# Patient Record
Sex: FEMALE | Race: BLACK OR AFRICAN AMERICAN | Employment: FULL TIME | URBAN - METROPOLITAN AREA
[De-identification: names, ages, dates, MRNs, and addresses within clinical notes are randomized per-mention and may not be internally consistent; named-entity substitution may affect disease eponyms.]

---

## 2024-04-01 ENCOUNTER — HOSPITAL ENCOUNTER (EMERGENCY)
Age: 39
Discharge: HOME | End: 2024-04-01
Attending: EMERGENCY MEDICINE
Payer: COMMERCIAL

## 2024-04-01 VITALS
RESPIRATION RATE: 18 BRPM | TEMPERATURE: 98.7 F | WEIGHT: 177 LBS | DIASTOLIC BLOOD PRESSURE: 95 MMHG | HEIGHT: 64 IN | BODY MASS INDEX: 30.22 KG/M2 | HEART RATE: 80 BPM | OXYGEN SATURATION: 100 % | SYSTOLIC BLOOD PRESSURE: 148 MMHG

## 2024-04-01 DIAGNOSIS — H00.012 HORDEOLUM OF RIGHT LOWER EYELID, UNSPECIFIED HORDEOLUM TYPE: ICD-10-CM

## 2024-04-01 DIAGNOSIS — H00.014 HORDEOLUM OF LEFT UPPER EYELID, UNSPECIFIED HORDEOLUM TYPE: ICD-10-CM

## 2024-04-01 DIAGNOSIS — H01.009 ACUTE BLEPHARITIS: Primary | ICD-10-CM

## 2024-04-01 PROCEDURE — 6370000000 HC RX 637 (ALT 250 FOR IP): Performed by: EMERGENCY MEDICINE

## 2024-04-01 PROCEDURE — 99283 EMERGENCY DEPT VISIT LOW MDM: CPT

## 2024-04-01 RX ORDER — HYDROXYZINE HYDROCHLORIDE 25 MG/1
25 TABLET, FILM COATED ORAL EVERY 8 HOURS PRN
Qty: 30 TABLET | Refills: 0 | Status: SHIPPED | OUTPATIENT
Start: 2024-04-01 | End: 2024-04-11

## 2024-04-01 RX ORDER — ERYTHROMYCIN 5 MG/G
OINTMENT OPHTHALMIC
Status: COMPLETED | OUTPATIENT
Start: 2024-04-01 | End: 2024-04-01

## 2024-04-01 RX ORDER — TETRACAINE HYDROCHLORIDE 5 MG/ML
1 SOLUTION OPHTHALMIC
Status: COMPLETED | OUTPATIENT
Start: 2024-04-01 | End: 2024-04-01

## 2024-04-01 RX ORDER — ERYTHROMYCIN 5 MG/G
OINTMENT OPHTHALMIC
Qty: 3.5 G | Refills: 0 | Status: SHIPPED | OUTPATIENT
Start: 2024-04-01

## 2024-04-01 RX ADMIN — TETRACAINE HYDROCHLORIDE 1 DROP: 5 SOLUTION OPHTHALMIC at 10:06

## 2024-04-01 RX ADMIN — FLUORESCEIN SODIUM 1 MG: 1 STRIP OPHTHALMIC at 10:06

## 2024-04-01 RX ADMIN — ERYTHROMYCIN: 5 OINTMENT OPHTHALMIC at 10:07

## 2024-04-01 ASSESSMENT — LIFESTYLE VARIABLES
HOW MANY STANDARD DRINKS CONTAINING ALCOHOL DO YOU HAVE ON A TYPICAL DAY: 1 OR 2
HOW OFTEN DO YOU HAVE A DRINK CONTAINING ALCOHOL: 2-4 TIMES A MONTH

## 2024-04-01 ASSESSMENT — PAIN - FUNCTIONAL ASSESSMENT: PAIN_FUNCTIONAL_ASSESSMENT: 0-10

## 2024-04-01 ASSESSMENT — PAIN DESCRIPTION - ORIENTATION: ORIENTATION: RIGHT

## 2024-04-01 ASSESSMENT — VISUAL ACUITY
OU: 20/25
OD: 20/40
OS: 20/25

## 2024-04-01 ASSESSMENT — PAIN DESCRIPTION - LOCATION: LOCATION: EAR

## 2024-04-01 ASSESSMENT — PAIN SCALES - GENERAL: PAINLEVEL_OUTOF10: 3

## 2024-04-01 NOTE — ED TRIAGE NOTES
Patient right eye is swollen with some pain. It started swelling yesterday. No injury and not sure what is causing it. No hx of season allergies.

## 2024-04-01 NOTE — DISCHARGE INSTRUCTIONS
Continue to wash her eyelids with baby shampoo as described in the directions below.  Use warm compresses over your eyelids as this can help with the stye.  Follow-up with either your eye specialist or the ophthalmologist listed.  You will need to schedule follow-up appointment.  Take the antihistamine as prescribed for the next 2 to 3 days then as needed  Use the erythromycin ointment for the next 3 to 5 days then as needed

## 2024-04-01 NOTE — ED PROVIDER NOTES
Emergency Department Provider Note       PCP: Isi Beckman MD   Age: 38 y.o.   Sex: female     DISPOSITION Decision To Discharge 04/01/2024 09:58:08 AM       ICD-10-CM    1. Acute blepharitis  H01.009       2. Hordeolum of left upper eyelid, unspecified hordeolum type  H00.014       3. Hordeolum of right lower eyelid, unspecified hordeolum type  H00.012           Medical Decision Making     DDX:    Conjunctivitis, orbital cellulitis, periorbital cellulitis, globe rupture, corneal abrasion, iritis, uveitis,     ED Course as of 04/01/24 0959   Mon Apr 01, 2024   0956 I talked the patient about the findings in the emergency department on the physical exam.  There is no evidence of trauma or injury to the cornea bilaterally.  We talked about the management of the blepharitis and hordeolums [KH]      ED Course User Index  [KH] Rolando Schmitz, DO     1 acute, uncomplicated illness or injury.  Prescription drug management performed.  Shared medical decision making was utilized in creating the patients health plan today.    I independently ordered and reviewed each unique test.                     History     Patient is a 38-year-old female presenting to the emergency department today complaining of swelling to the right upper eyelid.  The patient states that started yesterday and got worse overnight.  The patient notes that she did some painting this weekend but did not come in contact with any solvents or other chemicals.  She not tried any new make-ups and denies any known history of seasonal allergies.  The patient does have a stye on the left upper eyelid which has been there for almost a year and on the right lower eyelid which has been there for several months.  The patient denies any pain with extraocular eye motion or discharge from the eye.          ROS     Review of Systems     Physical Exam     Vitals signs and nursing note reviewed:  Vitals:    04/01/24 0836   BP: (!) 148/95   Pulse: 80   Resp:

## 2024-04-01 NOTE — ED NOTES
Patient mobility status  with no difficulty. Provider aware     I have reviewed discharge instructions with the patient.  The patient verbalized understanding.    Patient left ED via Discharge Method: ambulatory to Home with  Self .    Opportunity for questions and clarification provided.     Patient given 2 scripts.